# Patient Record
Sex: FEMALE | HISPANIC OR LATINO | Employment: FULL TIME | URBAN - METROPOLITAN AREA
[De-identification: names, ages, dates, MRNs, and addresses within clinical notes are randomized per-mention and may not be internally consistent; named-entity substitution may affect disease eponyms.]

---

## 2017-02-09 ENCOUNTER — OFFICE VISIT (OUTPATIENT)
Dept: URGENT CARE | Facility: PHYSICIAN GROUP | Age: 37
End: 2017-02-09
Payer: COMMERCIAL

## 2017-02-09 VITALS
DIASTOLIC BLOOD PRESSURE: 70 MMHG | TEMPERATURE: 99.1 F | RESPIRATION RATE: 16 BRPM | SYSTOLIC BLOOD PRESSURE: 108 MMHG | HEIGHT: 64 IN | OXYGEN SATURATION: 97 % | HEART RATE: 82 BPM | BODY MASS INDEX: 34.66 KG/M2 | WEIGHT: 203 LBS

## 2017-02-09 DIAGNOSIS — R68.89 FLU-LIKE SYMPTOMS: ICD-10-CM

## 2017-02-09 LAB
FLUAV+FLUBV AG SPEC QL IA: NEGATIVE
INT CON NEG: NEGATIVE
INT CON POS: POSITIVE

## 2017-02-09 PROCEDURE — 99213 OFFICE O/P EST LOW 20 MIN: CPT | Performed by: EMERGENCY MEDICINE

## 2017-02-09 PROCEDURE — 87804 INFLUENZA ASSAY W/OPTIC: CPT | Performed by: EMERGENCY MEDICINE

## 2017-02-09 RX ORDER — BENZONATATE 100 MG/1
100 CAPSULE ORAL 3 TIMES DAILY PRN
Qty: 30 CAP | Refills: 0 | Status: SHIPPED | OUTPATIENT
Start: 2017-02-09 | End: 2018-03-27

## 2017-02-09 RX ORDER — DOXYCYCLINE HYCLATE 100 MG
100 TABLET ORAL 2 TIMES DAILY
Qty: 20 TAB | Refills: 0 | Status: SHIPPED | OUTPATIENT
Start: 2017-02-09 | End: 2018-03-27

## 2017-02-09 ASSESSMENT — ENCOUNTER SYMPTOMS
STRIDOR: 0
FEVER: 1
SHORTNESS OF BREATH: 0
HEADACHES: 0
CHILLS: 0
EYE REDNESS: 0
SORE THROAT: 0
EYE DISCHARGE: 0
VOMITING: 0
SENSORY CHANGE: 0
NAUSEA: 0
ABDOMINAL PAIN: 0
BACK PAIN: 0
NERVOUS/ANXIOUS: 0
COUGH: 1
NECK PAIN: 0

## 2017-02-09 NOTE — Clinical Note
February 9, 2017        Aruna Cole  [address]  [place] [address]        Dear Aruna:    Please ask to be allowed to stay home from school/ work for the next two- three days, through Sundays.    If you have any questions or concerns, please don't hesitate to call.        Sincerely,        NIKITA Escobar M.D.    Electronically Signed

## 2017-02-09 NOTE — MR AVS SNAPSHOT
"        Aruna Cole   2017 9:35 AM   Office Visit   MRN: [id number]    Department:  [facility]   Dept Phone:  [phone]    Description:  Female : 1980   Provider:  NIKITA Escobar M.D.           Reason for Visit     Cough runny nose, chest congestion body aches sinus pressure onset - last night       Allergies as of 2017     No Known Allergies      You were diagnosed with     Flu-like symptoms   [[id number]]         Vital Signs     Blood Pressure Pulse Temperature Respirations Height Weight    108/70 mmHg 82 37.3 °C (99.1 °F) 16 1.613 m (5' 3.5\") 92.08 kg (203 lb)    Body Mass Index Oxygen Saturation Smoking Status             35.39 kg/m2 97% Never Smoker          Basic Information     Date Of Birth Sex Race Ethnicity Preferred Language    1980 Female  or   Origin (Kosovan,Sao Tomean,Iraqi,Yash, etc) English      Problem List              ICD-10-CM Priority Class Noted - Resolved    Seasonal allergic rhinitis J30.2 High  2011 - Present    Positive PPD R76.11   2011 - Present    DARLENE (obstructive sleep apnea) G47.33   2011 - Present    Environmental allergies Z91.09   2016 - Present      Health Maintenance        Date Due Completion Dates    PAP SMEAR 1999 ---    IMM DTaP/Tdap/Td Vaccine (2 - Td) 2021            Results     POCT Influenza A/B      Component    Rapid Influenza A-B    Negative    Internal Control Positive    Positive    Internal Control Negative    Negative                        Current Immunizations     Influenza TIV (IM) 2014, 2013, 2013    Influenza Vaccine Quad Inj (Pf) 10/12/2016  6:12 PM, 10/16/2015    Tdap Vaccine 2011    Tuberculin Skin Test 2014, 2012  4:10 AM, 2011, 2011      Below and/or attached are the medications your provider expects you to take. Review all of your home medications and newly ordered medications with your provider and/or pharmacist. Follow " medication instructions as directed by your provider and/or pharmacist. Please keep your medication list with you and share with your provider. Update the information when medications are discontinued, doses are changed, or new medications (including over-the-counter products) are added; and carry medication information at all times in the event of emergency situations     Allergies:  No Known Allergies          Medications  Valid as of: February 09, 2017 - 11:33 AM    Generic Name Brand Name Tablet Size Instructions for use    Albuterol Sulfate (Aero Soln) VENTOLIN  (90 BASE) MCG/ACT INHALE 2 PUFFS UP TO QID PRF SHORTNESS OF BREATH        Benzonatate (Cap) TESSALON 100 MG Take 1 Cap by mouth 3 times a day as needed for Cough.        Calcium Carbonate-Vitamin D (Tab) OSCAL 250-125 MG-UNIT Take 1 Tab by mouth every day.        Doxycycline Hyclate (Tab) VIBRAMYCIN 100 MG Take 1 Tab by mouth 2 times a day.        EPINEPHrine (Solution Auto-injector) EPIPEN 2-CARLOS 0.3 MG/0.3ML INJECT 1 SYRINGE INTRAMUSCULARLY AS DIRECTED PRF ANAPHYLAXIS REACTION        Ibuprofen (Tab) MOTRIN 200 MG Take 200 mg by mouth every 6 hours as needed.        Omega-3 Fatty Acids (Cap) OMEGA 3 FA 1000 MG Take 1,000 mg by mouth 3 times a day, with meals.        Prenatal Multivit-Min-Fe-FA   Take  by mouth.        .                 Medicines prescribed today were sent to:     [facility] [address] [place] [address], [place] - [facility] [address] [place] [address] [address] [place] [address]    Phone: [phone] Fax: [phone]    Open 24 Hours?: No      Medication refill instructions:       If your prescription bottle indicates you have medication refills left, it is not necessary to call your provider’s office. Please contact your pharmacy and they will refill your medication.    If your prescription bottle indicates you do not have any refills left, you may request refills at any time through one of the following ways: The  online ROOOMERS system (except Urgent Care), by calling your provider’s office, or by asking your pharmacy to contact your provider’s office with a refill request. Medication refills are processed only during regular business hours and may not be available until the next business day. Your provider may request additional information or to have a follow-up visit with you prior to refilling your medication.   *Please Note: Medication refills are assigned a new Rx number when refilled electronically. Your pharmacy may indicate that no refills were authorized even though a new prescription for the same medication is available at the pharmacy. Please request the medicine by name with the pharmacy before contacting your provider for a refill.           ROOOMERS Access Code: Activation code not generated  Current ROOOMERS Status: Active

## 2017-02-09 NOTE — PROGRESS NOTES
Subjective:      Aruna Cole is a 36 y.o. female who presents with Cough            Cough  This is a new problem. The current episode started yesterday. The problem has been gradually worsening. The problem occurs constantly. The cough is non-productive. Associated symptoms include a fever (felt warm) and nasal congestion. Pertinent negatives include no chest pain, chills, ear congestion, eye redness, headaches, rash, sore throat or shortness of breath.   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  NKDA  Social History     Social History   • Marital Status:      Spouse Name: N/A   • Number of Children: 2   • Years of Education: N/A     Occupational History   • RN [employer]     Med tele [employer]     Social History Main Topics   • Smoking status: Never Smoker    • Smokeless tobacco: Never Used   • Alcohol Use: 0.0 oz/week     0 Standard drinks or equivalent per week      Comment: occasional once per week or once per month   • Drug Use: No   • Sexual Activity:     Partners: Male      Comment: hustband vasectomy     Other Topics Concern   • Not on file     Social History Narrative     Past Medical History   Diagnosis Date   • Allergy    • Positive PPD 2011     Current Outpatient Prescriptions on File Prior to Visit   Medication Sig Dispense Refill   • calcium-vitamin D (OSCAL) 250-125 MG-UNIT per tablet Take 1 Tab by mouth every day.     • Prenatal Multivit-Min-Fe-FA (PRE-SEDA PO) Take  by mouth.     • docosahexanoic acid (OMEGA 3 FA) 1000 MG CAPS Take 1,000 mg by mouth 3 times a day, with meals.     • EPIPEN 2-CARLOS 0.3 MG/0.3ML Solution Auto-injector solution for injection INJECT 1 SYRINGE INTRAMUSCULARLY AS DIRECTED PRF ANAPHYLAXIS REACTION  0   • VENTOLIN  (90 BASE) MCG/ACT Aero Soln inhalation aerosol INHALE 2 PUFFS UP TO QID PRF SHORTNESS OF BREATH  1   • ibuprofen (MOTRIN) 200 MG TABS Take 200 mg by mouth every 6 hours as needed.       No current facility-administered medications on file  "prior to visit.     Family History   Problem Relation Age of Onset   • Hypertension Mother    • Diabetes Maternal Aunt    • Heart Disease Neg Hx    • Stroke Neg Hx    • Hyperlipidemia Neg Hx    • No Known Problems Father      Review of Systems   Constitutional: Positive for fever (felt warm). Negative for chills.   HENT: Positive for congestion. Negative for sore throat.    Eyes: Negative for discharge and redness.   Respiratory: Positive for cough. Negative for shortness of breath and stridor.    Cardiovascular: Negative for chest pain.   Gastrointestinal: Negative for nausea, vomiting and abdominal pain.   Genitourinary: Negative.    Musculoskeletal: Negative for back pain and neck pain.   Skin: Negative for rash.   Neurological: Negative for sensory change and headaches.   Psychiatric/Behavioral: The patient is not nervous/anxious.           Objective:     /70 mmHg  Pulse 82  Temp(Src) 37.3 °C (99.1 °F)  Resp 16  Ht 1.613 m (5' 3.5\")  Wt 92.08 kg (203 lb)  BMI 35.39 kg/m2  SpO2 97%     Physical Exam   Constitutional: She appears well-developed and well-nourished. No distress.   HENT:   Head: Normocephalic and atraumatic.   Right Ear: External ear normal.   Left Ear: External ear normal.   Bilateral nasal congestion.   Eyes: Conjunctivae are normal. Pupils are equal, round, and reactive to light.   Neck: Normal range of motion.   Cardiovascular: Normal rate and regular rhythm.    Pulmonary/Chest: Effort normal and breath sounds normal.   Abdominal: Soft. Bowel sounds are normal. She exhibits no distension. There is no tenderness.   Musculoskeletal: Normal range of motion. She exhibits no edema or tenderness.   Neurological: She is alert.   Skin: Skin is warm and dry. She is not diaphoretic. No erythema.   Psychiatric: She has a normal mood and affect. Her behavior is normal.   Vitals reviewed.              Assessment/Plan:   DX URI      I am recommending the patient initiate/ continue hydration " efforts including the use of a vaporizer/humidifier/ netti pot. I also recommend the pt, initiate Mucinex. In addition the patient will initiate the prescribed prescription medication/s: Tessalon, patient will also initiate doxycycline on a contingency basis for worsening phlegm. If the patient's condition exacerbates with worsening dysphagia, shortness of breath, uncontrolled fever, headache or chest pressure he/she will return immediately to the urgent care or go to  the emergency department for further evaluation. Patient given a work note for the next 2 days off. NIKITA Escobar

## 2017-09-18 ENCOUNTER — EH NON-PROVIDER (OUTPATIENT)
Dept: OCCUPATIONAL MEDICINE | Facility: CLINIC | Age: 37
End: 2017-09-18

## 2017-09-18 DIAGNOSIS — Z29.89 NEED FOR ISOLATION: ICD-10-CM

## 2017-09-18 PROCEDURE — 94375 RESPIRATORY FLOW VOLUME LOOP: CPT

## 2017-10-04 ENCOUNTER — IMMUNIZATION (OUTPATIENT)
Dept: OCCUPATIONAL MEDICINE | Facility: CLINIC | Age: 37
End: 2017-10-04

## 2017-10-04 DIAGNOSIS — Z23 NEED FOR VACCINATION: ICD-10-CM

## 2017-10-04 PROCEDURE — 90686 IIV4 VACC NO PRSV 0.5 ML IM: CPT | Performed by: PREVENTIVE MEDICINE

## 2018-03-14 ENCOUNTER — OCCUPATIONAL MEDICINE (OUTPATIENT)
Dept: URGENT CARE | Facility: PHYSICIAN GROUP | Age: 38
End: 2018-03-14
Payer: COMMERCIAL

## 2018-03-14 ENCOUNTER — APPOINTMENT (OUTPATIENT)
Dept: RADIOLOGY | Facility: IMAGING CENTER | Age: 38
End: 2018-03-14
Attending: PHYSICIAN ASSISTANT
Payer: COMMERCIAL

## 2018-03-14 VITALS
SYSTOLIC BLOOD PRESSURE: 104 MMHG | HEIGHT: 64 IN | BODY MASS INDEX: 35.71 KG/M2 | TEMPERATURE: 98.3 F | OXYGEN SATURATION: 98 % | WEIGHT: 209.2 LBS | DIASTOLIC BLOOD PRESSURE: 60 MMHG | HEART RATE: 76 BPM

## 2018-03-14 DIAGNOSIS — M54.32 SCIATICA OF LEFT SIDE: ICD-10-CM

## 2018-03-14 LAB
AMP AMPHETAMINE: NEGATIVE
AMP AMPHETAMINE: NORMAL
BAR BARBITURATES: NEGATIVE
BREATH ALCOHOL COMMENT: 0
BREATH ALCOHOL COMMENT: NORMAL
BZO BENZODIAZEPINES: NEGATIVE
COC COCAINE: NEGATIVE
COC COCAINE: NORMAL
INT CON NEG: NEGATIVE
INT CON NEG: NORMAL
INT CON POS: NORMAL
INT CON POS: POSITIVE
MDMA ECSTASY: NEGATIVE
MET METHAMPHETAMINES: NEGATIVE
MET METHAMPHETAMINES: NORMAL
MTD METHADONE: NEGATIVE
OPI OPIATES: NEGATIVE
OPI OPIATES: NORMAL
OXY OXYCODONE: NEGATIVE
PCP PHENCYCLIDINE: NEGATIVE
PCP PHENCYCLIDINE: NORMAL
POC BREATHALIZER: 0 PERCENT (ref 0–0.01)
POC BREATHALIZER: 0 PERCENT (ref 0–0.01)
POC DRUG COMMENT 753798-OCCUPATIONAL HEALTH: NEGATIVE
POC URINE DRUG SCREEN OCDRS: NORMAL
THC: NEGATIVE
THC: NORMAL

## 2018-03-14 PROCEDURE — 82075 ASSAY OF BREATH ETHANOL: CPT | Performed by: FAMILY MEDICINE

## 2018-03-14 PROCEDURE — 80305 DRUG TEST PRSMV DIR OPT OBS: CPT | Performed by: FAMILY MEDICINE

## 2018-03-14 PROCEDURE — 99214 OFFICE O/P EST MOD 30 MIN: CPT | Performed by: FAMILY MEDICINE

## 2018-03-14 PROCEDURE — 72100 X-RAY EXAM L-S SPINE 2/3 VWS: CPT | Mod: TC | Performed by: PHYSICIAN ASSISTANT

## 2018-03-14 RX ORDER — CYCLOBENZAPRINE HCL 10 MG
10 TABLET ORAL 3 TIMES DAILY PRN
Qty: 21 TAB | Refills: 0 | Status: SHIPPED | OUTPATIENT
Start: 2018-03-14 | End: 2018-03-21

## 2018-03-14 RX ORDER — METHYLPREDNISOLONE 4 MG/1
TABLET ORAL
Qty: 21 TAB | Refills: 0 | Status: SHIPPED | OUTPATIENT
Start: 2018-03-14 | End: 2018-03-27

## 2018-03-14 ASSESSMENT — ENCOUNTER SYMPTOMS
CHILLS: 0
ARTHRALGIAS: 1
FEVER: 0
BACK PAIN: 0
FATIGUE: 0
NUMBNESS: 1
VERTIGO: 0
JOINT SWELLING: 0
COUGH: 0
FALLS: 1

## 2018-03-14 ASSESSMENT — PAIN SCALES - GENERAL: PAINLEVEL: 2=MINIMAL-SLIGHT

## 2018-03-14 NOTE — PROGRESS NOTES
Aruna Cole is a 37 y.o. female here for a non-provider visit for Drug Screen 3/14/18    If abnormal was an in office provider notified today (if so, indicate provider)? No  Routed to PCP? No

## 2018-03-14 NOTE — LETTER
[facility]  [address] - [place] [address]  Phone:  [phone] - Fax:  [phone]   [facility] Progress Report and Disability Certification  Date of Service: 3/14/2018   No Show:  No  Date / Time of Next Visit: 3/21/2018   Claim Information   Patient Name: Aruna Cole  Claim Number:     Employer: [employer] *** Date of Injury: 2/28/2018     Insurer / TPA: [employer] *** ID / SSN:     Occupation: RN *** Diagnosis: The encounter diagnosis was Sciatica of left side.    Medical Information   Related to Industrial Injury?   Comments:undetermined ***   Subjective Complaints:  DO I 2/28/18. Patient is a registered nurse at [employer]. She was walking in the hallway where they had just freshly waxed the floor slipped and fell landing on her back and hitting her back and her head. She also hit her right knee. She had immediate pain in the right knee. 2 days later she began to experience pain in the posterior left leg burning pain as well as numbness and tingling of the entire leg. She reports no improvement over the past 2 weeks despite using ibuprofen 800 mg every 6 hours. The patient denies any bowel or bladder incontinence, unexplained fevers, or weakness of the left lower extremity. The patient does have a prior history of straining her low back several months ago after lifting a patient for which she did not seek care. She also has a history of low back pain related to a previous pregnancy 9 years ago.   Objective Findings: Lumbar spine: No step-off or deformity, no bony tenderness, no SI tenderness bilateral. Positive straight leg raise on left worse with dorsiflexion of the foot and improved with flexion of the knee. No weakness noted of the left lower extremity. Absent ankle deep tendon reflex on the left. DTRs patellar are 2+ equal bilaterally. Patient is able to heel and toe walk without difficulty or foot drop. Sensation to  light touch equal bilateral lower extremities. Full range of motion without limitation or pain of the lumbar spine.  Right knee: No soft tissue swelling or effusion. Small area of ecchymosis over the tibial tuberosity which is slightly tender to palpation. No ligamentous laxity, negative Lachman negative Brea.   Pre-Existing Condition(s):     Assessment:   Initial Visit    Status: Additional Care Required  Permanent Disability:No    Plan:      Diagnostics:      Comments:       Disability Information   Status: Released to Restricted Duty    From:  3/14/2018  Through: 3/21/2018 Restrictions are: Temporary   Physical Restrictions   Sitting:    Standing:  < or = to 4 hrs/day Stooping:  < or = to 4 hrs/day Bending:  < or = to 4 hrs/day   Squatting:  < or = to 4 hrs/day Walking:  < or = to 4 hrs/day Climbing:  < or = to 4 hrs/day Pushing:  < or = to 4 hrs/day   Pulling:  < or = to 4 hrs/day Other:    Reaching Above Shoulder (L):   Reaching Above Shoulder (R):       Reaching Below Shoulder (L):    Reaching Below Shoulder (R):      Not to exceed Weight Limits   Carrying(hrs):   Weight Limit(lb): < or = to 10 pounds Lifting(hrs):   Weight  Limit(lb): < or = to 10 pounds   Comments: X-ray lumbar spine  Given the duration of time from onset of injury to present and the fact that she has not responded to ibuprofen 800 mg every 6 hours we will go ahead and treat her with Medrol Dosepak and flexeril. Case discussed with Dr. Jackson who is in agreement with the plan and recommend follow-up with the pat ient in one week in urgent care. If continued symptoms at that point would then consider next follow-up with occupational health. Work restrictions as indicated on D-39    Repetitive Actions   Hands: i.e. Fine Manipulations from Grasping:     Feet: i.e. Operating Foot Controls:     Driving / Operate Machinery:     Physician Name: Jennifer Koch D.O. Physician Signature:   e-Signature: Dr. Gabino Garcia, Medical Director      Clinic Name / Location: [facility]  [address] [place] [address]. [address]  [place] [address] Clinic Phone Number: Dept: [phone]   Appointment Time: 10:20 Am Visit Start Time: 11:20 AM   Check-In Time:  10:49 Am Visit Discharge Time:  ***   Original-Treating Physician or Chiropractor    Page 2-Insurer/TPA    Page 3-Employer    Page 4-Employee

## 2018-03-14 NOTE — PROGRESS NOTES
Subjective:   Aruna Cole is a 37 y.o. female who presents for Other ([facility] DOI Injury Head, R knee, L leg feeling numb/tingiling/radiating, pt let wax machine go by and she stepped on the wet floor and she ended up slipping and falling. Pt hit head on the ground as well as the L knee)       Other   This is a recurrent problem. The current episode started 1 to 4 weeks ago. The problem occurs constantly. The problem has been gradually worsening. Associated symptoms include arthralgias and numbness. Pertinent negatives include no chest pain, chills, coughing, fatigue, fever, joint swelling, rash or vertigo. Nothing aggravates the symptoms. She has tried NSAIDs for the symptoms. The treatment provided no relief.    DO I 2/28/18. 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 She also hit her right knee. She had immediate pain in the right knee. 2 days later she began to experience pain in the posterior left leg burning pain as well as numbness and tingling of the entire leg. She reports no improvement over the past 2 weeks despite using ibuprofen 800 mg every 6 hours. The patient denies any bowel or bladder incontinence, unexplained fevers, or weakness of the left lower extremity. The patient does have a prior history of straining her low back several months ago after lifting a patient for which she did not seek care. She also has a history of low back pain related to a previous pregnancy 9 years ago.  Review of Systems   Constitutional: Negative for chills, fatigue and fever.   Respiratory: Negative for cough.    Cardiovascular: Negative for chest pain.   Musculoskeletal: Positive for arthralgias, falls and joint pain. Negative for back pain and joint swelling.   Skin: Negative for rash.   Neurological: Positive for numbness. Negative for vertigo.   All other  "systems reviewed and are negative.    No Known Allergies   Objective:   /60   Pulse 76   Temp 36.8 °C (98.3 °F)   Ht 1.613 m (5' 3.5\")   Wt 94.9 kg (209 lb 3.2 oz)   SpO2 98%   BMI 36.48 kg/m²   Physical Exam   Constitutional: She is oriented to person, place, and time. She appears well-developed and well-nourished.   HENT:   Head: Normocephalic and atraumatic.   Right Ear: External ear normal.   Left Ear: External ear normal.   Nose: Nose normal.   Mouth/Throat: Oropharynx is clear and moist. No oropharyngeal exudate.   Eyes: Conjunctivae and EOM are normal. Pupils are equal, round, and reactive to light.   Neck: Normal range of motion. Neck supple.   Cardiovascular: Normal rate, regular rhythm and normal heart sounds.  Exam reveals no gallop and no friction rub.    No murmur heard.  Pulmonary/Chest: Effort normal and breath sounds normal. No respiratory distress. She has no wheezes. She has no rales.   Abdominal: Soft. Bowel sounds are normal. She exhibits no distension and no mass. There is no tenderness. There is no rebound and no guarding.   Musculoskeletal: Normal range of motion. She exhibits no edema, tenderness or deformity.   Lymphadenopathy:     She has no cervical adenopathy.   Neurological: She is alert and oriented to person, place, and time. She has normal strength. No cranial nerve deficit or sensory deficit. Coordination and gait normal.   Reflex Scores:       Tricep reflexes are 2+ on the right side and 2+ on the left side.       Bicep reflexes are 2+ on the right side and 2+ on the left side.       Brachioradialis reflexes are 2+ on the right side and 2+ on the left side.       Patellar reflexes are 2+ on the right side and 2+ on the left side.       Achilles reflexes are 2+ on the right side and 0 on the left side.  Skin: Skin is warm and dry. No rash noted.   Psychiatric: She has a normal mood and affect. Judgment normal.     Lumbar spine: No step-off or deformity, no bony " tenderness, no SI tenderness bilateral. Positive straight leg raise on left worse with dorsiflexion of the foot and improved with flexion of the knee. No weakness noted of the left lower extremity. Absent ankle deep tendon reflex on the left. DTRs patellar are 2+ equal bilaterally. Patient is able to heel and toe walk without difficulty or foot drop. Sensation to light touch equal bilateral lower extremities. Full range of motion without limitation or pain of the lumbar spine.  Right knee: No soft tissue swelling or effusion. Small area of ecchymosis over the tibial tuberosity which is slightly tender to palpation. No ligamentous laxity, negative Lachman negative Brea.   Assessment/Plan:   Assessment    1. Sciatica of left side  - DX-LUMBAR SPINE-2 OR 3 VIEWS; Future  - MethylPREDNISolone (MEDROL DOSEPAK) 4 MG Tablet Therapy Pack; Use as directed  Dispense: 21 Tab; Refill: 0    X-ray lumbar spine  Given the duration of time from onset of injury to present and the fact that she has not responded to ibuprofen 800 mg every 6 hours we will go ahead and treat her with Medrol Dosepak and flexeril. Case discussed with Dr. Jackson who is in agreement with the plan and recommend follow-up with the pat ient in one week in urgent care. If continued symptoms at that point would then consider next follow-up with occupational health. Work restrictions as indicated on D-39    Differential diagnosis, natural history, supportive care, and indications for immediate follow-up discussed.

## 2018-03-14 NOTE — LETTER
"EMPLOYEE’S CLAIM FOR COMPENSATION/ REPORT OF INITIAL TREATMENT  FORM C-4    EMPLOYEE’S CLAIM - PROVIDE ALL INFORMATION REQUESTED   First Name  Aruna Last Name  Douglas Birthdate                    1980                Sex  female Claim Number   Home Address  [address] Age  37 y.o. Height  1.613 m (5' 3.5\") Weight  94.9 kg (209 lb 3.2 oz) [facility]     [facility] Zip  [address] Telephone  [phone] (home)    Mailing Address  [address] [facility] Zip  [address] Primary Language Spoken  English    Insurer   Third Party   Workers Choice   Employee's Occupation (Job Title) When Injury or Occupational Disease Occurred  RN    Employer's Name  [employer]  Telephone  [phone]    Employer Address  [address]  [facility]  Zip  [address]    Date of Injury  2/28/2018               Hour of Injury  5:00 PM Date Employer Notified  2/28/2018 Last Day of Work after Injury or Occupational Disease  3/13/2018 Supervisor to Whom Injury Reported  Donita Thomson   Address or Location of Accident (if applicable)  [[address] ]   What were you doing at the time of accident? (if applicable)  Walking    How did this injury or occupational disease occur? (Be specific an answer in detail. Use additional sheet if necessary)  Steped on some water left by the floor waxing machine w/out knowing and slipped - fell   If you believe that you have an occupational disease, when did you first have knowledge of the disability and it relationship to your employment?  n/a Witnesses to the Accident  Deepa Davis (RN'S)      Nature of Injury or Occupational Disease  Strain  Part(s) of Body Injured or Affected  Upper Leg (R), Knee (R), Skull    I certify that the above is true and correct to the best of my knowledge and that I have provided this information in order to obtain the benefits of [place] " [place]  I hereby authorize any physician, chiropractor, surgeon, practitioner, or other person, any hospital, including [facility] or [facility], any medical service organization, any insurance company, or other institution or organization to release to each other, any medical or other information, including benefits paid or payable, pertinent to this injury or disease, except information relative to diagnosis, treatment and/or counseling for AIDS, psychological conditions, alcohol or controlled substances, for which I must give specific authorization.  A Photostat of this authorization shall be as valid as the original.     Date   Place   Employee’s Signature   THIS REPORT MUST BE COMPLETED AND MAILED WITHIN 3 WORKING DAYS OF TREATMENT   Place  [facility]  Name of Facility  Only   Date  3/14/2018 Diagnosis  (M54.32) Sciatica of left side Is there evidence the injured employee was under the influence of alcohol and/or another controlled substance at the time of accident?   Hour  11:20 AM Description of Injury or Disease  The encounter diagnosis was Sciatica of left side. No   Treatment  X-ray lumbar spine  Given the duration of time from onset of injury to present and the fact that she has not responded to ibuprofen 800 mg every 6 hours we will go ahead and treat her with Medrol Dosepak. Given her abnormal neurologic exam I would recommend follow-up with occupational health in 3 days. Work restrictions as above.  Have you advised the patient to remain off work five days or more? No   X-Ray Findings    Comments:+ for DDD, no acute fracture   If Yes   From Date  To Date      From information given by the employee, together with medical evidence, can you directly connect this injury or occupational disease as job incurred?    Comments:undetermined If No Full Duty  No Modified Duty  Yes  "  Is additional medical care by a physician indicated?  Yes If Modified Duty, Specify any Limitations / Restrictions  See D-39 form   Do you know of any previous injury or disease contributing to this condition or occupational disease?                              Comments:pt reports strain to low back several months ago while lifting a patient which she did not report. Also hx of LBP during pregnancy 9 years ago   Date  3/14/2018 Print Doctor’s Name Jennifer Koch D.O. I certify the employer’s copy of  this form was mailed on:   Address  [address] [place] [address]. [address] Insurer’s Use Only     [facility] Zip  [address]    Provider’s Tax ID Number  [id number] Telephone  Dept: [phone]            e-Signature: Dr. Gabino Garcia, Medical Director Degree  DO        ORIGINAL-TREATING PHYSICIAN OR CHIROPRACTOR    PAGE 2-INSURER/TPA    PAGE 3-EMPLOYER    PAGE 4-EMPLOYEE             Form C-4 (rev10/07)              BRIEF DESCRIPTION OF RIGHTS AND BENEFITS  (Pursuant to [place]RS 616C.050)    Notice of Injury or Occupational Disease (Incident Report Form C-1): If an injury or occupational disease (OD) arises out of and in the  course of employment, you must provide written notice to your employer as soon as practicable, but no later than 7 days after the accident or  OD. Your employer shall maintain a sufficient supply of the required forms.    Claim for Compensation (Form C-4): If medical treatment is sought, the form C-4 is available at the place of initial treatment. A completed  \"Claim for Compensation\" (Form C-4) must be filed within 90 days after an accident or OD. The treating physician or chiropractor must,  within 3 working days after treatment, complete and mail to the employer, the employer's insurer and third-party , the Claim for  Compensation.    Medical Treatment: If you require medical treatment for your on-the-job injury or OD, you may be required to select a physician or  chiropractor " from a list provided by your workers’ compensation insurer, if it has contracted with an Organization for Managed Care (MCO) or  Preferred Provider Organization (PPO) or providers of health care. If your employer has not entered into a contract with an MCO or PPO, you  may select a physician or chiropractor from the Panel of Physicians and Chiropractors. Any medical costs related to your industrial injury or  OD will be paid by your insurer.    Temporary Total Disability (TTD): If your doctor has certified that you are unable to work for a period of at least 5 consecutive days, or 5  cumulative days in a 20-day period, or places restrictions on you that your employer does not accommodate, you may be entitled to TTD  compensation.    Temporary Partial Disability (TPD): If the wage you receive upon reemployment is less than the compensation for TTD to which you are  entitled, the insurer may be required to pay you TPD compensation to make up the difference. TPD can only be paid for a maximum of 24  months.    Permanent Partial Disability (PPD): When your medical condition is stable and there is an indication of a PPD as a result of your injury or  OD, within 30 days, your insurer must arrange for an evaluation by a rating physician or chiropractor to determine the degree of your PPD. The  amount of your PPD award depends on the date of injury, the results of the PPD evaluation and your age and wage.    Permanent Total Disability (PTD): If you are medically certified by a treating physician or chiropractor as permanently and totally disabled  and have been granted a PTD status by your insurer, you are entitled to receive monthly benefits not to exceed 66 2/3% of your average  monthly wage. The amount of your PTD payments is subject to reduction if you previously received a PPD award.    Vocational Rehabilitation Services: You may be eligible for vocational rehabilitation services if you are unable to return to the job  due to a  permanent physical impairment or permanent restrictions as a result of your injury or occupational disease.    Transportation and Per Lynn Reimbursement: You may be eligible for travel expenses and per lynn associated with medical treatment.    Reopening: You may be able to reopen your claim if your condition worsens after claim closure.    Appeal Process: If you disagree with a written determination issued by the insurer or the insurer does not respond to your request, you may  appeal to the Department of Administration, , by following the instructions contained in your determination letter. You must  appeal the determination within 70 days from the date of the determination letter at [address], [address], [place], [place]  [address], or [address], [address], [place], [place] [address]. If you disagree with the  decision, you may appeal to the  Department of Administration, . You must file your appeal within 30 days from the date of the  decision  letter at [address], [address], [place], [place] [address], or [address], [place], [place] [address]. If you  disagree with a decision of an , you may file a petition for judicial review with the District Court. You must do so within 30  days of the Appeal Officer’s decision. You may be represented by an  at your own expense or you may contact the [facility] for possible  representation.    [place]  for Injured Workers (NAIW): If you disagree with a  decision, you may request that NAIW represent you  without charge at an  Hearing. For information regarding denial of benefits, you may contact the [facility] at: [address], [address], [place] [address], [phone], or [address], [address], [place] [address], [phone]    To File a Complaint with the Division: If you  wish to file a complaint with the  of the Division of Industrial Relations (DIR),  please contact the Workers’ Compensation Section, [address], [address], [place], [place] [address], telephone [phone]) [phone], or  [address], [address], [place], [place] [address], telephone [phone].    For assistance with Workers’ Compensation Issues: you may contact the Office of the Governor Consumer Health Assistance, [address] [place] [address], [address], [place], [place] [address], Toll Free 1-753.904.4668, Web site: [email or web address].[email or web address]., E-mail  [email or web address].                                                                                                                                                                                                                                   __________________________________________________________________                                                                   _________________                Employee Name / Signature                                                                                                                                                       Date                                                                                                                                                                                                     D-2 (rev. 10/07)

## 2018-03-14 NOTE — LETTER
[facility]  [address] [place] [address]. [address] - [place] [address]  Phone:  [phone] - Fax:  [phone]   [facility] Progress Report and Disability Certification  Date of Service: 3/14/2018   No Show:  No  Date / Time of Next Visit: 3/21/2018 at 9:30am NH   Claim Information   Patient Name: Aruna Cole  Claim Number:     Employer: [employer]  Date of Injury: 2/28/2018     Insurer / TPA: Workers Choice  ID / SSN:     Occupation: RN  Diagnosis: The encounter diagnosis was Sciatica of left side.    Medical Information   Related to Industrial Injury?   Comments:undetermined    Subjective Complaints:  DO I 2/28/18. Patient is a registered nurse at [employer]. She was walking in the hallway where they had just freshly waxed the floor slipped and fell landing on her back and hitting her back and her head. She also hit her right knee. She had immediate pain in the right knee. 2 days later she began to experience pain in the posterior left leg burning pain as well as numbness and tingling of the entire leg. She reports no improvement over the past 2 weeks despite using ibuprofen 800 mg every 6 hours. The patient denies any bowel or bladder incontinence, unexplained fevers, or weakness of the left lower extremity. The patient does have a prior history of straining her low back several months ago after lifting a patient for which she did not seek care. She also has a history of low back pain related to a previous pregnancy 9 years ago.   Objective Findings: Lumbar spine: No step-off or deformity, no bony tenderness, no SI tenderness bilateral. Positive straight leg raise on left worse with dorsiflexion of the foot and improved with flexion of the knee. No weakness noted of the left lower extremity. Absent ankle deep tendon reflex on the left. DTRs patellar are 2+ equal bilaterally. Patient is able to heel and toe walk without difficulty or foot drop. Sensation  to light touch equal bilateral lower extremities. Full range of motion without limitation or pain of the lumbar spine.  Right knee: No soft tissue swelling or effusion. Small area of ecchymosis over the tibial tuberosity which is slightly tender to palpation. No ligamentous laxity, negative Lachman negative Brea.   Pre-Existing Condition(s):     Assessment:   Initial Visit    Status: Additional Care Required  Permanent Disability:No    Plan:      Diagnostics:      Comments:       Disability Information   Status: Released to Restricted Duty    From:  3/14/2018  Through: 3/21/2018 Restrictions are: Temporary   Physical Restrictions   Sitting:    Standing:  < or = to 4 hrs/day Stooping:  < or = to 4 hrs/day Bending:  < or = to 4 hrs/day   Squatting:  < or = to 4 hrs/day Walking:  < or = to 4 hrs/day Climbing:  < or = to 4 hrs/day Pushing:  < or = to 4 hrs/day   Pulling:  < or = to 4 hrs/day Other:    Reaching Above Shoulder (L):   Reaching Above Shoulder (R):       Reaching Below Shoulder (L):    Reaching Below Shoulder (R):      Not to exceed Weight Limits   Carrying(hrs):   Weight Limit(lb): < or = to 10 pounds Lifting(hrs):   Weight  Limit(lb): < or = to 10 pounds   Comments: X-ray lumbar spine  Given the duration of time from onset of injury to present and the fact that she has not responded to ibuprofen 800 mg every 6 hours we will go ahead and treat her with Medrol Dosepak and flexeril. Case discussed with Dr. Jackson who is in agreement with the plan and recommend follow-up with the pat ient in one week in urgent care. If continued symptoms at that point would then consider next follow-up with occupational health. Work restrictions as indicated on D-39    Repetitive Actions   Hands: i.e. Fine Manipulations from Grasping:     Feet: i.e. Operating Foot Controls:     Driving / Operate Machinery:     Physician Name: Jennifer Koch D.O. Physician Signature: HARRISON Mack-Signature:   Gabino Garcia, Medical Director   Clinic Name / Location: [address]  [place] [address] Clinic Phone Number: Dept: [phone]   Appointment Time: 10:20 Am Visit Start Time: 11:20 AM   Check-In Time:  10:49 Am Visit Discharge Time:  12:59PM   Original-Treating Physician or Chiropractor    Page 2-Insurer/TPA    Page 3-Employer    Page 4-Employee

## 2018-03-14 NOTE — PATIENT INSTRUCTIONS
Work phone when necessary Sciatica  Sciatica is pain, numbness, weakness, or tingling along the path of the sciatic nerve. The sciatic nerve starts in the lower back and runs down the back of each leg. The nerve controls the muscles in the lower leg and in the back of the knee. It also provides feeling (sensation) to the back of the thigh, the lower leg, and the sole of the foot. Sciatica is a symptom of another medical condition that pinches or puts pressure on the sciatic nerve.  Generally, sciatica only affects one side of the body. Sciatica usually goes away on its own or with treatment. In some cases, sciatica may keep coming back (recur).  What are the causes?  This condition is caused by pressure on the sciatic nerve, or pinching of the sciatic nerve. This may be the result of:  · A disk in between the bones of the spine (vertebrae) bulging out too far (herniated disk).  · Age-related changes in the spinal disks (degenerative disk disease).  · A pain disorder that affects a muscle in the buttock (piriformis syndrome).  · Extra bone growth (bone spur) near the sciatic nerve.  · An injury or break (fracture) of the pelvis.  · Pregnancy.  · Tumor (rare).  What increases the risk?  The following factors may make you more likely to develop this condition:  · Playing sports that place pressure or stress on the spine, such as football or weight lifting.  · Having poor strength and flexibility.  · A history of back injury.  · A history of back surgery.  · Sitting for long periods of time.  · Doing activities that involve repetitive bending or lifting.  · Obesity.  What are the signs or symptoms?  Symptoms can vary from mild to very severe, and they may include:  · Any of these problems in the lower back, leg, hip, or buttock:  ¨ Mild tingling or dull aches.  ¨ Burning sensations.  ¨ Sharp pains.  · Numbness in the back of the calf or the sole of the foot.  · Leg weakness.  · Severe back pain that makes movement  difficult.  These symptoms may get worse when you cough, sneeze, or laugh, or when you sit or stand for long periods of time. Being overweight may also make symptoms worse. In some cases, symptoms may recur over time.  How is this diagnosed?  This condition may be diagnosed based on:  · Your symptoms.  · A physical exam. Your health care provider may ask you to do certain movements to check whether those movements trigger your symptoms.  · You may have tests, including:  ¨ Blood tests.  ¨ X-rays.  ¨ MRI.  ¨ CT scan.  How is this treated?  In many cases, this condition improves on its own, without any treatment. However, treatment may include:  · Reducing or modifying physical activity during periods of pain.  · Exercising and stretching to strengthen your abdomen and improve the flexibility of your spine.  · Icing and applying heat to the affected area.  · Medicines that help:  ¨ To relieve pain and swelling.  ¨ To relax your muscles.  · Injections of medicines that help to relieve pain, irritation, and inflammation around the sciatic nerve (steroids).  · Surgery.  Follow these instructions at home:  Medicines  · Take over-the-counter and prescription medicines only as told by your health care provider.  · Do not drive or operate heavy machinery while taking prescription pain medicine.  Managing pain  · If directed, apply ice to the affected area.  ¨ Put ice in a plastic bag.  ¨ Place a towel between your skin and the bag.  ¨ Leave the ice on for 20 minutes, 2-3 times a day.  · After icing, apply heat to the affected area before you exercise or as often as told by your health care provider. Use the heat source that your health care provider recommends, such as a moist heat pack or a heating pad.  ¨ Place a towel between your skin and the heat source.  ¨ Leave the heat on for 20-30 minutes.  ¨ Remove the heat if your skin turns bright red. This is especially important if you are unable to feel pain, heat, or cold.  You may have a greater risk of getting burned.  Activity  · Return to your normal activities as told by your health care provider. Ask your health care provider what activities are safe for you.  ¨ Avoid activities that make your symptoms worse.  · Take brief periods of rest throughout the day. Resting in a lying or standing position is usually better than sitting to rest.  ¨ When you rest for longer periods, mix in some mild activity or stretching between periods of rest. This will help to prevent stiffness and pain.  ¨ Avoid sitting for long periods of time without moving. Get up and move around at least one time each hour.  · Exercise and stretch regularly, as told by your health care provider.  · Do not lift anything that is heavier than 10 lb (4.5 kg) while you have symptoms of sciatica. When you do not have symptoms, you should still avoid heavy lifting, especially repetitive heavy lifting.  · When you lift objects, always use proper lifting technique, which includes:  ¨ Bending your knees.  ¨ Keeping the load close to your body.  ¨ Avoiding twisting.  General instructions  · Use good posture.  ¨ Avoid leaning forward while sitting.  ¨ Avoid hunching over while standing.  · Maintain a healthy weight. Excess weight puts extra stress on your back and makes it difficult to maintain good posture.  · Wear supportive, comfortable shoes. Avoid wearing high heels.  · Avoid sleeping on a mattress that is too soft or too hard. A mattress that is firm enough to support your back when you sleep may help to reduce your pain.  · Keep all follow-up visits as told by your health care provider. This is important.  Contact a health care provider if:  · You have pain that wakes you up when you are sleeping.  · You have pain that gets worse when you lie down.  · Your pain is worse than you have experienced in the past.  · Your pain lasts longer than 4 weeks.  · You experience unexplained weight loss.  Get help right away  if:  · You lose control of your bowel or bladder (incontinence).  · You have:  ¨ Weakness in your lower back, pelvis, buttocks, or legs that gets worse.  ¨ Redness or swelling of your back.  ¨ A burning sensation when you urinate.  This information is not intended to replace advice given to you by your health care provider. Make sure you discuss any questions you have with your health care provider.  Document Released: 12/12/2002 Document Revised: 05/23/2017 Document Reviewed: 08/26/2016  [employer]vier Interactive Patient Education © 2017 [employer]

## 2018-03-14 NOTE — LETTER
[facility]  [address] [place] [address]. [address] - [place] [address]  Phone:  [phone] - Fax:  [phone]   [facility] Progress Report and Disability Certification  Date of Service: 3/14/2018   No Show:  No  Date / Time of Next Visit: 3/21/2018   Claim Information   Patient Name: Aruna Cole  Claim Number:     Employer: [employer]  Date of Injury: 2/28/2018     Insurer / TPA: Workers [employer]  ID / SSN:     Occupation: RN  Diagnosis: The encounter diagnosis was Sciatica of left side.    Medical Information   Related to Industrial Injury?   Comments:undetermined    Subjective Complaints:  DO I 2/28/18. Patient is a registered nurse at [employer]. She was walking in the hallway where they had just freshly waxed the floor slipped and fell landing on her back and hitting her back and her head. She also hit her right knee. She had immediate pain in the right knee. 2 days later she began to experience pain in the posterior left leg burning pain as well as numbness and tingling of the entire leg. She reports no improvement over the past 2 weeks despite using ibuprofen 800 mg every 6 hours. The patient denies any bowel or bladder incontinence, unexplained fevers, or weakness of the left lower extremity. The patient does have a prior history of straining her low back several months ago after lifting a patient for which she did not seek care. She also has a history of low back pain related to a previous pregnancy 9 years ago.   Objective Findings: Lumbar spine: No step-off or deformity, no bony tenderness, no SI tenderness bilateral. Positive straight leg raise on left worse with dorsiflexion of the foot and improved with flexion of the knee. No weakness noted of the left lower extremity. Absent ankle deep tendon reflex on the left. DTRs patellar are 2+ equal bilaterally. Patient is able to heel and toe walk without difficulty or foot drop. Sensation to light touch  equal bilateral lower extremities. Full range of motion without limitation or pain of the lumbar spine.  Right knee: No soft tissue swelling or effusion. Small area of ecchymosis over the tibial tuberosity which is slightly tender to palpation. No ligamentous laxity, negative Lachman negative Brea.   Pre-Existing Condition(s):     Assessment:   Initial Visit    Status: Additional Care Required  Permanent Disability:No    Plan:      Diagnostics:      Comments:       Disability Information   Status: Released to Restricted Duty    From:  3/14/2018  Through: 3/21/2018 Restrictions are: Temporary   Physical Restrictions   Sitting:    Standing:  < or = to 4 hrs/day Stooping:  < or = to 4 hrs/day Bending:  < or = to 4 hrs/day   Squatting:  < or = to 4 hrs/day Walking:  < or = to 4 hrs/day Climbing:  < or = to 4 hrs/day Pushing:  < or = to 4 hrs/day   Pulling:  < or = to 4 hrs/day Other:    Reaching Above Shoulder (L):   Reaching Above Shoulder (R):       Reaching Below Shoulder (L):    Reaching Below Shoulder (R):      Not to exceed Weight Limits   Carrying(hrs):   Weight Limit(lb): < or = to 10 pounds Lifting(hrs):   Weight  Limit(lb): < or = to 10 pounds   Comments: X-ray lumbar spine  Given the duration of time from onset of injury to present and the fact that she has not responded to ibuprofen 800 mg every 6 hours we will go ahead and treat her with Medrol Dosepak and flexeril. Case discussed with Dr. Jackson who is in agreement with the plan and recommend follow-up with the pat ient in one week in urgent care. If continued symptoms at that point would then consider next follow-up with occupational health. Work restrictions as indicated on D-39    Repetitive Actions   Hands: i.e. Fine Manipulations from Grasping:     Feet: i.e. Operating Foot Controls:     Driving / Operate Machinery:     Physician Name: Jennifer Koch D.O. Physician Signature:   e-Signature: Dr. Gabino Garcia, Medical Director   Clinic Name  "/ Location: [address]  [place] [address] Clinic Phone Number: Dept: [phone]   Appointment Time: 10:20 Am Visit Start Time: 11:20 AM   Check-In Time:  10:49 Am Visit Discharge Time: 12\"59PM   Original-Treating Physician or Chiropractor    Page 2-Insurer/TPA    Page 3-Employer    Page 4-Employee    "

## 2018-03-21 ENCOUNTER — OCCUPATIONAL MEDICINE (OUTPATIENT)
Dept: URGENT CARE | Facility: PHYSICIAN GROUP | Age: 38
End: 2018-03-21
Payer: COMMERCIAL

## 2018-03-21 VITALS
OXYGEN SATURATION: 96 % | SYSTOLIC BLOOD PRESSURE: 108 MMHG | WEIGHT: 209 LBS | DIASTOLIC BLOOD PRESSURE: 64 MMHG | BODY MASS INDEX: 35.68 KG/M2 | TEMPERATURE: 98.8 F | HEIGHT: 64 IN | HEART RATE: 72 BPM

## 2018-03-21 DIAGNOSIS — M54.32 SCIATICA, LEFT SIDE: Primary | ICD-10-CM

## 2018-03-21 PROCEDURE — 99212 OFFICE O/P EST SF 10 MIN: CPT | Mod: 29 | Performed by: NURSE PRACTITIONER

## 2018-03-21 RX ORDER — CYCLOBENZAPRINE HCL 10 MG
10 TABLET ORAL 3 TIMES DAILY PRN
Qty: 15 TAB | Refills: 0 | Status: SHIPPED | OUTPATIENT
Start: 2018-03-21

## 2018-03-21 ASSESSMENT — ENCOUNTER SYMPTOMS
HEADACHES: 0
CHILLS: 0
MYALGIAS: 0
FEVER: 0
ABDOMINAL PAIN: 0

## 2018-03-21 NOTE — PROGRESS NOTES
"Subjective:      Aruna Cole is a 37 y.o. female who presents with Leg Injury (WC [facility] DOI 3/14/18 Left lower leg )      Date of injury is 12/28/18. Patient was at work walking down the wax hallway slipped, landing on her back, back of her head, and her right knee. Patient self treated with ibuprofen without improvement until she came to seek care on 3/14/18. Patient was placed on light duty. She continue the ibuprofen. She was also given a Medrol Dosepak and Flexeril. She states she is improved significantly. He denies any right knee pain. She denies any back pain. She continues complain about a limited amount of pain in her posterior upper leg and some tingling sensation on the plantar surface of her foot.     Medications, Allergies and Prior Medical Hx reviewed and updated in [facility].with patient/family today           Review of Systems   Constitutional: Negative for chills and fever.   Gastrointestinal: Negative for abdominal pain.   Musculoskeletal: Positive for joint pain. Negative for myalgias.   Neurological: Negative for headaches.          Objective:     /64   Pulse 72   Temp 37.1 °C (98.8 °F)   Ht 1.613 m (5' 3.5\")   Wt 94.8 kg (209 lb)   SpO2 96%   BMI 36.44 kg/m²      Physical Exam   Constitutional: She appears well-developed and well-nourished. No distress.   HENT:   Head: Normocephalic and atraumatic.   Eyes: Conjunctivae are normal. Pupils are equal, round, and reactive to light.   Neck: Neck supple.   Cardiovascular: Normal rate.    Pulmonary/Chest: Effort normal. No respiratory distress.   Neurological: She is alert.   Awake, alert, answering questions appropriately, moving all extremeties   Skin: Skin is warm and dry. Capillary refill takes less than 2 seconds. No erythema.   Psychiatric: She has a normal mood and affect. Her behavior is normal.   Vitals reviewed.      Patient's awake and alert, no acute distress. She has no tenderness to her back, left buttocks or left upper leg. She " has strong leg raises on both sides. Flexion, extension of her feet are strong and equal bilaterally. Patellar reflexes are +3 sensation grossly intact bilaterally. She ambulates without difficulty. She can rotate her back without difficulty or pain. She can flex her back without difficulty.       Assessment/Plan:       1. Sciatica, left side  cyclobenzaprine (FLEXERIL) 10 MG Tab         Work restrictions as documented on D 39 form  Pt will go to the ER for worsening or changing symptoms as discussed, follow-up at a [facility]  as scheduled  Discharge instructions discussed with pt/family who verbalize understanding and agreement with poc

## 2018-03-21 NOTE — LETTER
[facility]  [address] [place] [address]. [address] - [place] [address]  Phone:  [phone] - Fax:  [phone]   [facility] Progress Report and Disability Certification  Date of Service: 3/21/2018   No Show:  No  Date / Time of Next Visit: 3/27/2018 @ 10:00 AM   Claim Information   Patient Name: Aruna Cole  Claim Number:     Employer: [employer]  Date of Injury: 2/28/2018     Insurer / TPA: Workers [employer]  ID / SSN:     Occupation: RN  Diagnosis: The encounter diagnosis was Sciatica, left side.    Medical Information   Related to Industrial Injury? Yes    Subjective Complaints:  Date of injury is 12/28/18. Patient was at work walking down the wax hallway slipped, landing on her back, back of her head, and her right knee. Patient self treated with ibuprofen without improvement until she came to seek care on 3/14/18. Patient was placed on light duty. She continue the ibuprofen. She was also given a Medrol Dosepak and Flexeril. She states she is improved significantly. He denies any right knee pain. She denies any back pain. She continues complain about a limited amount of pain in her posterior upper leg and some tingling sensation on the plantar surface of her foot.   Objective Findings: Patient's awake and alert, no acute distress. She has no tenderness to her back, left buttocks or left upper leg. She has strong leg raises on both sides. Flexion, extension of her feet are strong and equal bilaterally. Patellar reflexes are +3 sensation grossly intact bilaterally. She ambulates without difficulty. She can rotate her back without difficulty or pain. She can flex her back without difficulty.   Pre-Existing Condition(s):     Assessment:   Condition Improved    Status: Additional Care Required  Permanent Disability:No    Plan:      Diagnostics:      Comments:  Continue ibuprofen no more than 600 every 6 hours, continue ice and heat as needed. Take Flexeril as needed when not  at work.    Disability Information   Status: Released to Restricted Duty    From:  3/21/2018  Through: 3/27/2018 Restrictions are: Temporary   Physical Restrictions   Sitting:    Standing:    Stooping:    Bending:      Squatting:    Walking:    Climbing:    Pushing:      Pulling:    Other:    Reaching Above Shoulder (L):   Reaching Above Shoulder (R):       Reaching Below Shoulder (L):    Reaching Below Shoulder (R):      Not to exceed Weight Limits   Carrying(hrs):   Weight Limit(lb): < or = to 25 pounds Lifting(hrs):   Weight  Limit(lb): < or = to 25 pounds   Comments:      Repetitive Actions   Hands: i.e. Fine Manipulations from Grasping:     Feet: i.e. Operating Foot Controls:     Driving / Operate Machinery:     Physician Name: JOSE E Davey Physician Signature: NAZARIO Sevilla e-Signature: Dr. Gabino Garcia, Medical Director   Clinic Name / Location: [address]  [place] [address] Clinic Phone Number: Dept: [phone]   Appointment Time: 9:30 Am Visit Start Time: 9:42 AM   Check-In Time:  9:32 Am Visit Discharge Time: 10:07 Am    Original-Treating Physician or Chiropractor    Page 2-Insurer/TPA    Page 3-Employer    Page 4-Employee

## 2018-03-27 ENCOUNTER — OCCUPATIONAL MEDICINE (OUTPATIENT)
Dept: URGENT CARE | Facility: PHYSICIAN GROUP | Age: 38
End: 2018-03-27
Payer: COMMERCIAL

## 2018-03-27 VITALS
HEIGHT: 64 IN | WEIGHT: 209 LBS | HEART RATE: 72 BPM | SYSTOLIC BLOOD PRESSURE: 108 MMHG | DIASTOLIC BLOOD PRESSURE: 70 MMHG | BODY MASS INDEX: 35.68 KG/M2 | OXYGEN SATURATION: 98 %

## 2018-03-27 DIAGNOSIS — M54.32 SCIATICA, LEFT SIDE: ICD-10-CM

## 2018-03-27 PROCEDURE — 99213 OFFICE O/P EST LOW 20 MIN: CPT | Mod: 29 | Performed by: NURSE PRACTITIONER

## 2018-03-27 ASSESSMENT — ENCOUNTER SYMPTOMS
CHILLS: 0
EYE PAIN: 0
FEVER: 0
LOSS OF MOTION: 0
MYALGIAS: 0
NAUSEA: 0
SORE THROAT: 0
SHORTNESS OF BREATH: 0
MUSCLE WEAKNESS: 0
DIZZINESS: 0
VOMITING: 0

## 2018-03-27 NOTE — PROGRESS NOTES
Subjective:     Aruna Cole is a 37 y.o. female who presents for Leg Injury ( [facility]V DOI 3/14/18 Sciatica, left side)    Date of injury is 12/28/18. Patient was at work walking down the wax hallway slipped, landing on her back, back of her head, and her right knee. Patient self treated with ibuprofen without improvement until she came to seek care on 3/14/18. Patient has been working light duty and tolerating well. She  continues to use ibuprofen. She states she is improved significantly. SHe denies any right knee pain. She denies any back pain. She continues complain about a limited amount of pain in her posterior upper leg and some tingling sensation on the plantar surface of her foot. Patient requesting to be released to full duty without restrictions and discharged.   Leg Injury    The incident occurred more than 1 week ago. The incident occurred at work. The injury mechanism was a fall. The pain is present in the left leg. The quality of the pain is described as shooting. The pain is at a severity of 0/10. The patient is experiencing no pain. Pertinent negatives include no loss of motion or muscle weakness. She reports no foreign bodies present. Nothing aggravates the symptoms. She has tried NSAIDs and rest for the symptoms. The treatment provided significant relief.     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 No pertinent surgical history.  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[facility]     [employer]     Social History Main Topics   • Smoking status: Never Smoker   • Smokeless tobacco: Never Used   • Alcohol use 0.0 oz/week      Comment: occasional once per week or once per month   • Drug use: No   • Sexual activity: Yes     Partners: Male      Comment: hustband vasectomy     Other Topics Concern   • Not on file  "    Social History Narrative   • No narrative on file      Family History   Problem Relation Age of Onset   • Hypertension Mother    • No Known Problems Father    • Diabetes Maternal Aunt    • Heart Disease Neg Hx    • Stroke Neg Hx    • Hyperlipidemia Neg Hx     Review of Systems   Constitutional: Negative for chills and fever.   HENT: Negative for sore throat.    Eyes: Negative for pain.   Respiratory: Negative for shortness of breath.    Cardiovascular: Negative for chest pain.   Gastrointestinal: Negative for nausea and vomiting.   Genitourinary: Negative for hematuria.   Musculoskeletal: Negative for myalgias.   Skin: Negative for rash.   Neurological: Negative for dizziness.   No Known Allergies   Objective:   /70   Pulse 72   Ht 1.613 m (5' 3.5\")   Wt 94.8 kg (209 lb)   SpO2 98%   BMI 36.44 kg/m²   Physical Exam   Constitutional: She is oriented to person, place, and time. She appears well-developed and well-nourished. No distress.   HENT:   Head: Normocephalic and atraumatic.   Eyes: Conjunctivae and EOM are normal. Pupils are equal, round, and reactive to light.   Cardiovascular: Normal rate and regular rhythm.    No murmur heard.  Pulmonary/Chest: Effort normal and breath sounds normal. No respiratory distress.   Abdominal: Soft. She exhibits no distension. There is no tenderness.   Neurological: She is alert and oriented to person, place, and time. She has normal reflexes. No sensory deficit.   Skin: Skin is warm and dry.   Psychiatric: She has a normal mood and affect.     Patient's awake and alert, no acute distress. She has no tenderness to her back, left buttocks or left upper leg. She has strong leg raises on both sides. Flexion, extension of her feet are strong and equal bilaterally. Patellar reflexes are +3 sensation grossly intact bilaterally. She ambulates without difficulty. She can rotate her back without difficulty or pain. She can flex her back without difficulty   Assessment/Plan: "   Assessment    1. Sciatica, left side  Avoid bending, stooping, heavy or repetitive lifting until symptoms resolve.  Avoid prolonged sitting; frequent changes of position may be helpful.  Begin gentle stretching before activity when tolerated. Continue using NSAIDs, heat, gentle stretching, muscle relaxants when necessary  See D-39  Discharged MMI     Differential diagnosis, natural history, supportive care, and indications for immediate follow-up discussed.

## 2018-03-27 NOTE — LETTER
[facility]  [address] [facility] - [place] [address]  Phone:  [phone] - Fax:  [phone]   [facility] Progress Report and Disability Certification  Date of Service: 3/27/2018   No Show:  No  Date / Time of Next Visit:  Discharged    Claim Information   Patient Name: rAuna Cole  Claim Number:     Employer: [employer] *** Date of Injury: 2/28/2018     Insurer / TPA: Workers [employer] *** ID / SSN:     Occupation: RN *** Diagnosis: The encounter diagnosis was Sciatica, left side.    Medical Information   Related to Industrial Injury? Yes ***   Subjective Complaints:  Date of injury is 12/28/18. Patient was at work walking down the wax hallway slipped, landing on her back, back of her head, and her right knee. Patient self treated with ibuprofen without improvement until she came to seek care on 3/14/18. Patient has been working light duty and tolerating well. She  continues to use ibuprofen. She states she is improved significantly. SHe denies any right knee pain. She denies any back pain. She continues complain about a limited amount of pain in her posterior upper leg and some tingling sensation on the plantar surface of her foot. Patient requesting to be released to full duty without restrictions and discharged.    Objective Findings: Patient's awake and alert, no acute distress. She has no tenderness to her back, left buttocks or left upper leg. She has strong leg raises on both sides. Flexion, extension of her feet are strong and equal bilaterally. Patellar reflexes are +3 sensation grossly intact bilaterally. She ambulates without difficulty. She can rotate her back without difficulty or pain. She can flex her back without difficulty   Pre-Existing Condition(s):     Assessment:   Condition Improved    Status:    Permanent Disability:No    Plan:      Diagnostics:      Comments:       Disability Information   Status: Released to Full Duty    From:   3/28/2018  Through:   Restrictions are:     Physical Restrictions   Sitting:    Standing:    Stooping:    Bending:      Squatting:    Walking:    Climbing:    Pushing:      Pulling:    Other:    Reaching Above Shoulder (L):   Reaching Above Shoulder (R):       Reaching Below Shoulder (L):    Reaching Below Shoulder (R):      Not to exceed Weight Limits   Carrying(hrs):   Weight Limit(lb):   Lifting(hrs):   Weight  Limit(lb):     Comments:  Patient will be discharged without restrictions MMI.       Repetitive Actions   Hands: i.e. Fine Manipulations from Grasping:     Feet: i.e. Operating Foot Controls:     Driving / Operate Machinery:     Physician Name: ASHUTOSH Jain Physician Signature: CELESTE Mes[place] e-Signature: Dr. Gabino Garcia, Medical Director   Clinic Name / Location: [address]  [place] [address] Clinic Phone Number: Dept: [phone]   Appointment Time: 10:00 Am Visit Start Time: 10:24 AM   Check-In Time:  10:01 Am Visit Discharge Time: 11:17 Am ***   Original-Treating Physician or Chiropractor    Page 2-Insurer/TPA    Page 3-Employer    Page 4-Employee

## 2018-03-27 NOTE — LETTER
[facility]  [address] [facility] - [place] [address]  Phone:  [phone] - Fax:  [phone]   [facility] Progress Report and Disability Certification  Date of Service: 3/27/2018   No Show:  No  Date / Time of Next Visit:  Discharged    Claim Information   Patient Name: Aruna Cole  Claim Number:     Employer: [employer] *** Date of Injury: 2/28/2018     Insurer / TPA: [employer] *** ID / SSN:     Occupation: RN *** Diagnosis: The encounter diagnosis was Sciatica, left side.    Medical Information   Related to Industrial Injury? Yes ***   Subjective Complaints:  Date of injury is 12/28/18. Patient was at work walking down the wax hallway slipped, landing on her back, back of her head, and her right knee. Patient self treated with ibuprofen without improvement until she came to seek care on 3/14/18. Patient has been working light duty and tolerating well. She  continues to use ibuprofen. She states she is improved significantly. SHe denies any right knee pain. She denies any back pain. She continues complain about a limited amount of pain in her posterior upper leg and some tingling sensation on the plantar surface of her foot. Patient requesting to be released to full duty without restrictions and discharged.    Objective Findings: Patient's awake and alert, no acute distress. She has no tenderness to her back, left buttocks or left upper leg. She has strong leg raises on both sides. Flexion, extension of her feet are strong and equal bilaterally. Patellar reflexes are +3 sensation grossly intact bilaterally. She ambulates without difficulty. She can rotate her back without difficulty or pain. She can flex her back without difficulty   Pre-Existing Condition(s):     Assessment:   Condition Improved    Status:    Permanent Disability:No    Plan:      Diagnostics:      Comments:       Disability Information   Status: Released to Full Duty    From:   3/28/2018  Through:   Restrictions are:     Physical Restrictions   Sitting:    Standing:    Stooping:    Bending:      Squatting:    Walking:    Climbing:    Pushing:      Pulling:    Other:    Reaching Above Shoulder (L):   Reaching Above Shoulder (R):       Reaching Below Shoulder (L):    Reaching Below Shoulder (R):      Not to exceed Weight Limits   Carrying(hrs):   Weight Limit(lb):   Lifting(hrs):   Weight  Limit(lb):     Comments:  Patient will be discharged without restrictions MMI.       Repetitive Actions   Hands: i.e. Fine Manipulations from Grasping:     Feet: i.e. Operating Foot Controls:     Driving / Operate Machinery:     Physician Name: ASHUTOSH Jain Physician Signature: CELESTE Mesa e-Signature: Dr. Gabino Garcia, Medical Director   Clinic Name / Location: [address]  [place] [address] Clinic Phone Number: Dept: [phone]   Appointment Time: 10:00 Am Visit Start Time: 10:24 AM   Check-In Time:  10:01 Am Visit Discharge Time: 11:17 Am ***   Original-Treating Physician or Chiropractor    Page 2-Insurer/TPA    Page 3-Employer    Page 4-Employee

## 2018-03-27 NOTE — LETTER
[facility]  [address] [facility] - [place] [address]  Phone:  [phone] - Fax:  [phone]   [facility] Progress Report and Disability Certification  Date of Service: 3/27/2018   No Show:  No  Date / Time of Next Visit:  Discharged    Claim Information   Patient Name: Aruna Cole  Claim Number:     Employer: [employer]  Date of Injury: 2/28/2018     Insurer / TPA: [employer]  ID / SSN:     Occupation: RN  Diagnosis: The encounter diagnosis was Sciatica, left side.    Medical Information   Related to Industrial Injury? Yes    Subjective Complaints:  Date of injury is 12/28/18. Patient was at work walking down the wax hallway slipped, landing on her back, back of her head, and her right knee. Patient self treated with ibuprofen without improvement until she came to seek care on 3/14/18. Patient has been working light duty and tolerating well. She  continues to use ibuprofen. She states she is improved significantly. SHe denies any right knee pain. She denies any back pain. She continues complain about a limited amount of pain in her posterior upper leg and some tingling sensation on the plantar surface of her foot. Patient requesting to be released to full duty without restrictions and discharged.    Objective Findings: Patient's awake and alert, no acute distress. She has no tenderness to her back, left buttocks or left upper leg. She has strong leg raises on both sides. Flexion, extension of her feet are strong and equal bilaterally. Patellar reflexes are +3 sensation grossly intact bilaterally. She ambulates without difficulty. She can rotate her back without difficulty or pain. She can flex her back without difficulty   Pre-Existing Condition(s):     Assessment:   Condition Improved    Status:    Permanent Disability:No    Plan:      Diagnostics:      Comments:       Disability Information   Status:      From:     Through:   Restrictions are:       Physical Restrictions   Sitting:    Standing:    Stooping:    Bending:      Squatting:    Walking:    Climbing:    Pushing:      Pulling:    Other:    Reaching Above Shoulder (L):   Reaching Above Shoulder (R):       Reaching Below Shoulder (L):    Reaching Below Shoulder (R):      Not to exceed Weight Limits   Carrying(hrs):   Weight Limit(lb):   Lifting(hrs):   Weight  Limit(lb):     Comments: Avoid bending, stooping, heavy or repetitive lifting until symptoms resolve.  Avoid prolonged sitting; frequent changes of position may be helpful.  Begin gentle stretching before activity when tolerated. Patient will be discharged without restrictions MMI.       Repetitive Actions   Hands: i.e. Fine Manipulations from Grasping:     Feet: i.e. Operating Foot Controls:     Driving / Operate Machinery:     Physician Name: ASHUTOSH Jain Physician Signature: CELESTE Mes[place] e-Signature: Dr. Gabino Garcia, Medical Director   Clinic Name / Location: [address]  [place] [address] Clinic Phone Number: Dept: [phone]   Appointment Time: 10:00 Am Visit Start Time: 10:24 AM   Check-In Time:  10:01 Am Visit Discharge Time:  11:25 AM    Original-Treating Physician or Chiropractor    Page 2-Insurer/TPA    Page 3-Employer    Page 4-Employee